# Patient Record
Sex: FEMALE | Race: OTHER | HISPANIC OR LATINO
[De-identification: names, ages, dates, MRNs, and addresses within clinical notes are randomized per-mention and may not be internally consistent; named-entity substitution may affect disease eponyms.]

---

## 2019-10-02 ENCOUNTER — TRANSCRIPTION ENCOUNTER (OUTPATIENT)
Age: 39
End: 2019-10-02

## 2020-10-13 ENCOUNTER — EMERGENCY (EMERGENCY)
Facility: HOSPITAL | Age: 40
LOS: 1 days | Discharge: HOME | End: 2020-10-13
Attending: EMERGENCY MEDICINE | Admitting: EMERGENCY MEDICINE
Payer: COMMERCIAL

## 2020-10-13 VITALS
OXYGEN SATURATION: 98 % | HEART RATE: 87 BPM | RESPIRATION RATE: 18 BRPM | SYSTOLIC BLOOD PRESSURE: 117 MMHG | DIASTOLIC BLOOD PRESSURE: 71 MMHG | TEMPERATURE: 98 F

## 2020-10-13 PROCEDURE — 93010 ELECTROCARDIOGRAM REPORT: CPT

## 2020-10-13 PROCEDURE — 99284 EMERGENCY DEPT VISIT MOD MDM: CPT | Mod: 25

## 2020-10-13 PROCEDURE — 71046 X-RAY EXAM CHEST 2 VIEWS: CPT | Mod: 26

## 2020-10-13 RX ORDER — IBUPROFEN 200 MG
400 TABLET ORAL ONCE
Refills: 0 | Status: COMPLETED | OUTPATIENT
Start: 2020-10-13 | End: 2020-10-13

## 2020-10-13 RX ADMIN — Medication 400 MILLIGRAM(S): at 15:50

## 2020-10-13 NOTE — ED PROVIDER NOTE - PROGRESS NOTE DETAILS
Kuldip OG (PGY-2): Patient endorsing mild relief of her symptoms with Motrin, pending CXR, will continue to monitor

## 2020-10-13 NOTE — ED ADULT NURSE NOTE - NSIMPLEMENTINTERV_GEN_ALL_ED
Implemented All Universal Safety Interventions:  [place] to call system. Call bell, personal items and telephone within reach. Instruct patient to call for assistance. Room bathroom lighting operational. Non-slip footwear when patient is off stretcher. Physically safe environment: no spills, clutter or unnecessary equipment. Stretcher in lowest position, wheels locked, appropriate side rails in place.

## 2020-10-13 NOTE — ED PROVIDER NOTE - OBJECTIVE STATEMENT
40y F w/ no significant PMHx presenting with R-sided chest pain that woke her from sleep this morning. Patient describes pain as a sharp "pushing" sensation, worsened with palpation to chest wall and when lying on her R side. Denies previous hx of similar chest pain in the past. States she ws diagnosed with COVID in April and endorses mild persistent sore throat an cough that has persisted since resolution of her COVID symptoms. States chest pain does not radiate, is not associated with nausea, vomiting, or diaphoresis. Has been taking Tylenol at home for her symptoms, endorses on mild relief of her pain. Denies fever, chills, sob, abdominal pain, rash, recent sick contacts or trauma.

## 2020-10-13 NOTE — ED PROVIDER NOTE - PHYSICAL EXAMINATION
Physical Exam:  Gen: NAD, non-toxic appearing, able to ambulate without assistance  Lung: CTAB, no respiratory distress, no wheezes/rhonchi/rales B/L, speaking in full sentences  CV: RRR, no murmurs, rubs or gallops  Chest Wall: +tenderness to palpation over R anterior chest wall   Abd: Soft, NT   Neuro: No focal sensory or motor deficits  Skin: Warm, well perfused, no rash   Psych: normal affect, calm  Marleny Christiansen D.O. -Resident

## 2020-10-13 NOTE — ED PROVIDER NOTE - PATIENT PORTAL LINK FT
You can access the FollowMyHealth Patient Portal offered by [facility] by registering at the following website: [email or web address]. By joining [employer]’s FollowMyHealth portal, you will also be able to view your health information using other applications (apps) compatible with our system.

## 2020-10-13 NOTE — ED ADULT NURSE NOTE - OBJECTIVE STATEMENT
right chest pain awoken from sleep patient took 326mg tylenol with minimal relief. pain returned this AM. denies nausea vomiting. 12 lead EKG preformed in triage NSR. breathing even unlabored denies SOB. breath sounds clear.

## 2020-10-13 NOTE — ED PROVIDER NOTE - NSFOLLOWUPINSTRUCTIONS_ED_ALL_ED_FT
- Lab and imaging results, if performed, were discussed with you along with your discharge diagnosis    - Follow up with your doctor in 1 week - bring copies of your results if you were given.    - Return to the ED for any new, worsening, or concerning symptoms to you    - Take ibuprofen/tylenol as directed as needed for pain  To control your pain at home, you should take Ibuprofen 400 mg along with Tylenol 650mg-1000mg every 6 to 8 hours. Limit your maximum daily Tylenol from all sources to 4000mg. Be aware that many other medications contain acetaminophen which is also known as Tylenol. Taking Tylenol and Ibuprofen together has been shown to be more effective at relieving pain than taking them separately. These are both over the counter medications that you can  at your local pharmacy without a prescription. You need to respect all of the warnings on the bottles. You shouldn’t take these medications for more than a week without following up with your doctor. Both medications come with certain risks and side effects that you need to discuss with your doctor, especially if you are taking them for a prolonged period.    - Rest and keep yourself hydrated with fluids

## 2020-10-13 NOTE — ED PROVIDER NOTE - CLINICAL SUMMARY MEDICAL DECISION MAKING FREE TEXT BOX
40y F w/ no significant PMHx presenting with R-sided chest pain that woke her from sleep this morning. VS WNL, EKG NSR, +tenderness on palpation over R chest wall, lungs ctab. Likely costochondritis vs. other MSK etiology. Doubt intra-pulmonary etiology or cardiac. Will obtain CXR and give Motrin for pain and reassess.

## 2020-10-13 NOTE — ED PROVIDER NOTE - NS ED ROS FT
CONSTITUTIONAL: No fevers, no chills, no lightheadedness, no dizziness  CV: +R-sided chest pain, no palpitations  RESP: No SOB, + chronic cough  GI: No n/v/d, no abd pain  : no dysuria, no hematuria  MSK: no back pain, no extremity pain  SKIN: no rashes  NEURO: no headache, no focal weakness, no decreased sensation/paresthesias

## 2020-10-13 NOTE — ED ADULT NURSE NOTE - CHPI ED NUR SYMPTOMS NEG
no vomiting/no diaphoresis/no congestion/no dizziness/no fever/no nausea/no syncope/no shortness of breath/no back pain/no chills

## 2020-10-13 NOTE — ED PROVIDER NOTE - ATTENDING CONTRIBUTION TO CARE
I performed a face to face evaluation of this patient and performed a full history and physical examination on the patient.  I agree with the resident's history, physical examination, and plan of the patient.  Pt with right sided chest wall pain, no sob, cough, improved with tylenol, lungs cta and heart wnl.  Pt with Covid in April so will get ekg and cxr, pain meds as needed and likely discharge home

## 2020-10-13 NOTE — ED PROVIDER NOTE - RESPIRATORY, MLM
Breath sounds clear and equal bilaterally.  Chest wall ttp right, no crepitus, no masses felt on chest wall or breast masses felt

## 2020-10-15 ENCOUNTER — APPOINTMENT (OUTPATIENT)
Dept: ORTHOPEDIC SURGERY | Facility: CLINIC | Age: 40
End: 2020-10-15
Payer: COMMERCIAL

## 2020-10-15 VITALS — WEIGHT: 198 LBS | HEIGHT: 66 IN | BODY MASS INDEX: 31.82 KG/M2

## 2020-10-15 VITALS — HEART RATE: 90 BPM | SYSTOLIC BLOOD PRESSURE: 117 MMHG | DIASTOLIC BLOOD PRESSURE: 79 MMHG

## 2020-10-15 VITALS — TEMPERATURE: 98.2 F | TEMPERATURE: 98.2 F

## 2020-10-15 DIAGNOSIS — Z72.3 LACK OF PHYSICAL EXERCISE: ICD-10-CM

## 2020-10-15 DIAGNOSIS — Z78.9 OTHER SPECIFIED HEALTH STATUS: ICD-10-CM

## 2020-10-15 DIAGNOSIS — M75.31 CALCIFIC TENDINITIS OF RIGHT SHOULDER: ICD-10-CM

## 2020-10-15 PROCEDURE — 99204 OFFICE O/P NEW MOD 45 MIN: CPT

## 2020-10-16 PROBLEM — Z78.9 NO PERTINENT PAST MEDICAL HISTORY: Status: RESOLVED | Noted: 2020-10-16 | Resolved: 2020-10-16

## 2020-10-16 PROBLEM — Z78.9 DOES NOT USE ILLICIT DRUGS: Status: ACTIVE | Noted: 2020-10-16

## 2020-10-16 PROBLEM — Z72.3 DOES NOT EXERCISE: Status: ACTIVE | Noted: 2020-10-16

## 2020-10-16 RX ORDER — NAPROXEN SODIUM 220 MG
TABLET ORAL
Refills: 0 | Status: ACTIVE | COMMUNITY

## 2020-10-16 RX ORDER — ACETAMINOPHEN 325 MG/1
TABLET, FILM COATED ORAL
Refills: 0 | Status: ACTIVE | COMMUNITY

## 2020-10-16 NOTE — HISTORY OF PRESENT ILLNESS
[de-identified] : Patient is here for right rib pain. She woke up on 10/12 with a pressure like chest pain. She does not recall any injury. She went to [facility] ER later that day. She had an EKG performed that was WNL.  She also had a chest xray taken that was negative for fracture or other abnormal finding. She was told that she has costochondritis. She has taken Aleve but has not noticed improvement in her pain. She carries a heavy bag daily. She has some radiation of pain towards her elbow. Denies N/T/Prior injury/SOB. She works as an assistant principle. She states that her father had to have open heart surgery. \par \par The patient's past medical history, past surgical history, medications and allergies were reviewed by me today and documented accordingly. In addition, the patient's family and social history, which were noncontributory to this visit, were reviewed also. Intake form was reviewed. The patient has no family history of arthritis.

## 2020-10-16 NOTE — RETURN TO WORK/SCHOOL
[FreeTextEntry1] : Charmaine was seen today for evaluation of right shoulder/chest wall pain.  She is clear to resume full work duty without restrictions at this time.\par Thank you for your understanding.\par \par Sincerely,\par \par Marcell Polo DO, ATC\par Primary Care Sports Medicine\par [facility] [place]\par

## 2020-10-16 NOTE — DISCUSSION/SUMMARY
[de-identified] : Discussed findings of today's exam and possible causes of patient's pain.  Educated patient on their most probable diagnosis of right shoulder pain due to calcific tendinitis/subacromial impingement with referred pain into the upper arm and chest wall, and potential pain due to compensatory activation of the chest wall musculature.  Reviewed possible courses of treatment, and we collaboratively decided best course of treatment at this time will include conservative management.  Patient started on a course of oral NSAIDs, prescription given for diclofenac (We discussed all possible side effects of this medication).  Patient will be started on a course of physical therapy to restore normal range of motion and strength as tolerated.  If patient has persisting pain may consider diagnostic/therapeutic subacromial cortisone injection as a future treatment option.  Follow up as needed.  Patient appreciates and agrees with current plan.\par \par This note was generated using dragon medical dictation software.  A reasonable effort has been made for proofreading its contents, but typos may still remain.  If there are any questions or points of clarification needed please notify my office.

## 2020-10-16 NOTE — PHYSICAL EXAM
[de-identified] : Constitutional: Well-nourished, well-developed, No acute distress\par Respiratory:  Good respiratory effort, no SOB\par Lymphatic: No regional lymphadenopathy, no lymphedema\par Psychiatric: Pleasant and normal affect, alert and oriented x3\par Skin: Clean dry and intact B/L UE/LE\par Musculoskeletal: normal except where as noted in regional exam\par \par \par Left Shoulder:\par APPEARANCE: no marked deformities, no swelling or malalignment\par POSITIVE TENDERNESS: none\par NONTENDER: supraspinatus, infraspinatus, teres minor, LH biceps, anterior and posterior capsule, AC joint\par ROM: full & painless, no scapular winging or dyskinesia present\par RESISTIVE TESTING: painless 5/5 resisted flex/ext, empty can/ER/IR, horizontal abd/add \par SPECIAL TESTS: neg Drop Arm, neg Empty Can, neg Tesfaye/Neers, neg Alegria's, neg Speeds, neg Apprehension, neg cross arm adduction, neg apley's scratch test\par Vasc: 2+ radial pulse\par Neuro: AIN, PIN, Ulnar nerve intact to motor, DTRs 2+/4 biceps, triceps, brachioradialis\par Sensation: Intact to light touch throughout\par B/L Elbows:  No asymmetry, malalignment, or swelling, Full ROM, 5/5 strength in flexion/ext, pronation/supination, Joints stable\par B/L Wrist and Hand:  No asymmetry, malalignment, or swelling, Full ROM, 5/5 strength in wrist and long finger flexion/ext, radial/ulnar deviation, Joints stable\par \par Right Shoulder:\par APPEARANCE: no marked deformities, no swelling or malalignment\par POSITIVE TENDERNESS: supraspinatus, long head biceps tendon, mild tenderness of pec minor and pec major along superolateral chest wall\par NONTENDER:  SC joint, costochondral junctions of right sided ribs.  infraspinatus, teres minor. biceps. anterior and posterior capsule. AC joint. \par ROM: full with mild painful arc past 60 degrees, no scapular winging or dyskinesia present\par RESISTIVE TESTING: MMT 4+/5 ER, Flexion and Empty can, 5/5 IR. painless 5/5 resisted ext, horizontal abd/add \par SPECIAL TESTS: + Tesfaye and Neers, mildly + cross arm adduction, + Speeds, neg Alegria's, neg Drop Arm, neg Apprehension. neg apley's scratch test\par Vasc: 2+ radial pulse\par Neuro: AIN, PIN, Ulnar nerve intact to motor, DTRs 2+/4 biceps, triceps, brachioradialis\par Sensation: Intact to light touch throughout\par \par  [de-identified] : I reviewed and clinically correlated the following outside imaging studies,\par EXAM: XR CHEST PA LAT 2V \par PROCEDURE DATE: Oct 13 2020 \par INTERPRETATION: CLINICAL INDICATION: Chest pain. \par EXAM: Frontal and lateral views of the chest with no prior radiographs. \par FINDINGS: \par The heart size is normal. \par The lungs are clear. There are no pleural effusions or pneumothorax. \par The bones are unremarkable. \par IMPRESSION: \par Clear lungs. \par \par My review of the AP chest shows evidence of small calcific deposits in the right subacromial space.

## 2020-10-18 ENCOUNTER — TRANSCRIPTION ENCOUNTER (OUTPATIENT)
Age: 40
End: 2020-10-18

## 2020-10-21 ENCOUNTER — APPOINTMENT (OUTPATIENT)
Dept: CARDIOLOGY | Facility: CLINIC | Age: 40
End: 2020-10-21

## 2020-11-02 ENCOUNTER — TRANSCRIPTION ENCOUNTER (OUTPATIENT)
Age: 40
End: 2020-11-02

## 2022-07-15 NOTE — ED PROVIDER NOTE - ENMT, MLM
PT called and states she will need a nurse to give the rx [facility] [id number] a verbal to go ahead and refill her adderall and the pharm said that it would be ok if the nurse did this due to pt going to be leaving for vacation soon. Airway patent, Nasal mucosa clear. Mouth with normal mucosa. Throat has no vesicles, no oropharyngeal exudates and uvula is midline.